# Patient Record
Sex: MALE | Race: WHITE | NOT HISPANIC OR LATINO | URBAN - METROPOLITAN AREA
[De-identification: names, ages, dates, MRNs, and addresses within clinical notes are randomized per-mention and may not be internally consistent; named-entity substitution may affect disease eponyms.]

---

## 2023-04-28 ENCOUNTER — TELEPHONE (OUTPATIENT)
Dept: SURGERY | Facility: CLINIC | Age: 73
End: 2023-04-28
Payer: OTHER GOVERNMENT

## 2023-04-28 NOTE — TELEPHONE ENCOUNTER
----- Message from Krissy Turner sent at 4/28/2023 12:31 PM CDT -----  Regarding: Referral  Good afternoon      Dr. Maynard at [facility] would like to refer the following patient to in the Colon & Rectal department department. 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 I have scanned the patients referral and records into .       Thank you,   Krissy Alanis

## 2023-04-28 NOTE — TELEPHONE ENCOUNTER
Spoke with Giselle at [facility] regarding appointment per referral. Appointment scheduled for first available morning appointment. Details confirmed verbally over phone.

## 2023-05-04 ENCOUNTER — TELEPHONE (OUTPATIENT)
Dept: SURGERY | Facility: CLINIC | Age: 73
End: 2023-05-04
Payer: OTHER GOVERNMENT

## 2023-05-04 NOTE — TELEPHONE ENCOUNTER
Spoke with Leora, nurse at [facility]. States that Mr. Perez had a PET scan at the [facility]. Leora was having problems with her computer to see lab work etc. Will return call.

## 2023-05-22 ENCOUNTER — OFFICE VISIT (OUTPATIENT)
Dept: SURGERY | Facility: CLINIC | Age: 73
End: 2023-05-22
Payer: OTHER GOVERNMENT

## 2023-05-22 ENCOUNTER — TELEPHONE (OUTPATIENT)
Dept: SURGERY | Facility: CLINIC | Age: 73
End: 2023-05-22
Payer: OTHER GOVERNMENT

## 2023-05-22 DIAGNOSIS — K63.89 COLONIC MASS: Primary | ICD-10-CM

## 2023-05-22 DIAGNOSIS — I25.10 CORONARY ARTERY DISEASE INVOLVING NATIVE CORONARY ARTERY OF NATIVE HEART WITHOUT ANGINA PECTORIS: ICD-10-CM

## 2023-05-22 DIAGNOSIS — K74.60 HEPATIC CIRRHOSIS, UNSPECIFIED HEPATIC CIRRHOSIS TYPE, UNSPECIFIED WHETHER ASCITES PRESENT: ICD-10-CM

## 2023-05-22 DIAGNOSIS — I73.9 PERIPHERAL VASCULAR DISEASE: ICD-10-CM

## 2023-05-22 DIAGNOSIS — C34.11 MALIGNANT NEOPLASM OF UPPER LOBE OF RIGHT LUNG: ICD-10-CM

## 2023-05-22 PROCEDURE — 99203 OFFICE O/P NEW LOW 30 MIN: CPT | Mod: S$PBB,,, | Performed by: COLON & RECTAL SURGERY

## 2023-05-22 PROCEDURE — 99999 PR PBB SHADOW E&M-EST. PATIENT-LVL III: CPT | Mod: PBBFAC,,, | Performed by: COLON & RECTAL SURGERY

## 2023-05-22 PROCEDURE — 99213 OFFICE O/P EST LOW 20 MIN: CPT | Mod: PBBFAC | Performed by: COLON & RECTAL SURGERY

## 2023-05-22 RX ORDER — POLYETHYLENE GLYCOL 3350 17 G/17G
17 POWDER, FOR SOLUTION ORAL
COMMUNITY

## 2023-05-22 RX ORDER — ALBUTEROL SULFATE 90 UG/1
2 INHALANT RESPIRATORY (INHALATION) EVERY 6 HOURS PRN
COMMUNITY

## 2023-05-22 RX ORDER — LANOLIN ALCOHOL/MO/W.PET/CERES
1000 CREAM (GRAM) TOPICAL
COMMUNITY

## 2023-05-22 RX ORDER — IBUPROFEN/PSEUDOEPHEDRINE HCL 200MG-30MG
TABLET ORAL
COMMUNITY

## 2023-05-22 RX ORDER — GABAPENTIN 300 MG/1
300 CAPSULE ORAL
COMMUNITY

## 2023-05-22 RX ORDER — AMOXICILLIN 250 MG
1 CAPSULE ORAL EVERY MORNING
COMMUNITY

## 2023-05-22 RX ORDER — HYDROCODONE BITARTRATE AND ACETAMINOPHEN 7.5; 325 MG/1; MG/1
1 TABLET ORAL EVERY 6 HOURS PRN
COMMUNITY

## 2023-05-22 RX ORDER — ROSUVASTATIN CALCIUM 20 MG/1
20 TABLET, COATED ORAL
COMMUNITY

## 2023-05-22 RX ORDER — POTASSIUM CHLORIDE 750 MG/1
10 CAPSULE, EXTENDED RELEASE ORAL
COMMUNITY

## 2023-05-22 RX ORDER — ACETAMINOPHEN 500 MG
500 TABLET ORAL EVERY 6 HOURS PRN
COMMUNITY

## 2023-05-22 RX ORDER — METOPROLOL TARTRATE 25 MG/1
25 TABLET, FILM COATED ORAL
COMMUNITY

## 2023-05-22 RX ORDER — LIDOCAINE 50 MG/G
1 PATCH TOPICAL
COMMUNITY

## 2023-05-22 RX ORDER — CLOPIDOGREL BISULFATE 75 MG/1
75 TABLET ORAL
COMMUNITY

## 2023-05-22 RX ORDER — L. ACIDOPHILUS/L.BULGARICUS 1MM CELL
1 TABLET ORAL EVERY MORNING
COMMUNITY

## 2023-05-22 NOTE — TELEPHONE ENCOUNTER
Faxed [facility] for PET scan to [facility], marked ASAP.  [phone], fax: [phone].

## 2023-06-01 ENCOUNTER — TELEPHONE (OUTPATIENT)
Dept: SURGERY | Facility: CLINIC | Age: 73
End: 2023-06-01
Payer: OTHER GOVERNMENT

## 2023-06-01 NOTE — TELEPHONE ENCOUNTER
Spoke with Dr Maynard from the [facility], states that per Dr Bermudez, patient can be cleared for surgery. He may also stop Plavix for surgery. Dr Maynard suggests Dr Louis speak with Dr Bermudez. Patient has had no chest pain/ difficulty breathing since his stent placements in December. Dr. Bermudez Cardiology cell [phone]. Dr Maynard can be reached at her office [phone] or her cell: [phone] with any questions. Will need to call floor nurse at [facility] when surgery is scheduled so they may arrange transportation for patient [phone] or [phone]. Dr Maynard also states that we will need to fax recent office visit with Dr Louis to floor nurse for records.

## 2023-06-02 ENCOUNTER — TELEPHONE (OUTPATIENT)
Dept: SURGERY | Facility: CLINIC | Age: 73
End: 2023-06-02
Payer: OTHER GOVERNMENT

## 2023-06-02 NOTE — TELEPHONE ENCOUNTER
Spoke with MANUELA Osorio. Informed her that Dr Louis is still working on patient's note. Should be completed by Monday.